# Patient Record
Sex: FEMALE | Race: WHITE | NOT HISPANIC OR LATINO | URBAN - METROPOLITAN AREA
[De-identification: names, ages, dates, MRNs, and addresses within clinical notes are randomized per-mention and may not be internally consistent; named-entity substitution may affect disease eponyms.]

---
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
## 2021-09-02 ENCOUNTER — OFFICE VISIT - RIVER FALLS (OUTPATIENT)
Dept: FAMILY MEDICINE | Facility: CLINIC | Age: 21
End: 2021-09-02

## 2021-09-02 ASSESSMENT — MIFFLIN-ST. JEOR: SCORE: 1515.34

## 2022-02-11 VITALS
WEIGHT: 151 LBS | SYSTOLIC BLOOD PRESSURE: 104 MMHG | HEIGHT: 69 IN | TEMPERATURE: 97.9 F | DIASTOLIC BLOOD PRESSURE: 78 MMHG | RESPIRATION RATE: 16 BRPM | BODY MASS INDEX: 22.36 KG/M2 | HEART RATE: 64 BPM

## 2022-02-11 VITALS — WEIGHT: 156.6 LBS | HEART RATE: 84 BPM | TEMPERATURE: 99.1 F | OXYGEN SATURATION: 98 %

## 2022-02-16 NOTE — TELEPHONE ENCOUNTER
---------------------  From: Christine Springer   To: Kevon Polanco MD;     Sent: 1/22/2020 1:42:03 PM CST  Subject: Scheduling Management     Cancelled her appointment for a rash on her arm and did not reschedule.Noted

## 2022-02-16 NOTE — LETTER
(Inserted Image. Unable to display)       February 26, 2020      CAR SPRAGUE  [address]  [place], [place] [id number]        Dear CAR,     Thank you for selecting [facility] for your healthcare needs. Below you will find the results of your recent test(s) done at our clinic.      Your results are normal!  Please come back for a follow up appointment if you are still having symptoms.       Result Name Current Result Reference Range   Group A Strep POC  NOT DETECTED 2/13/2020 NOT DETECTED -    Culture Strep A  See comment 2/13/2020      Please contact my practice at [phone] if you have any questions or concerns.     Sincerely,        Evelyn Sparks MD      What do your labs mean?  Below is a glossary of commonly ordered labs:  LDL   Bad Cholesterol   HDL   Good Cholesterol  AST/ALT   Liver Function   Cr/Creatinine   Kidney Function  Microalbumin   Kidney Function  BUN   Kidney Function  PSA   Prostate    TSH   Thyroid Hormone  HgbA1c   Diabetes Test   Hgb (Hemoglobin)   Red Blood Cells

## 2022-02-16 NOTE — PROGRESS NOTES
Patient:   CAR SPRAGUE            MRN: [id number]            FIN: [id number]               Age:   21 years     Sex:  Female     :  2000   Associated Diagnoses:   Routine sports physical exam   Author:   Jet HENNESSY, Bar HAGEN      Results Review   General results      Health Status   Allergies:    Allergic Reactions (Selected)  No Known Medication Allergies   Medications:  (Selected)   , not on any regular medications   Problem list:    No problem items selected or recorded.      Chief Complaint   2021 12:24 PM CDT    Pt here for Sports Px for Hockey at [facility] [employer]  (Modified)      Subjective   Here for sports physical for hockey at [employer] [employer]  had her first Pfizer Covid vaccine last week   up to date on Tetanus         Histories   Past Medical History:    No active or resolved past medical history items have been selected or recorded.   Family History:    Breast cancer  Mother     Procedure history:    Extraction of wisdom tooth ([id number]).   Social History:        Electronic Cigarette/Vaping Assessment            Electronic Cigarette Use: Never.      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    Eye:  Pupils are equal, round and reactive to light, Extraocular movements are intact.    HENT:   Tympanic membranes are clear, No pharyngeal erythema, No sinus tenderness.    Neck:  Supple, Non-tender, No lymphadenopathy.    Respiratory:  Lungs are clear to auscultation.    Cardiovascular:  Normal rate, Regular rhythm, No murmur.    Gastrointestinal:  Soft, Non-tender, Non-distended, Normal bowel sounds, No organomegaly.    Musculoskeletal:  Normal range of motion.    Neurologic:  Cranial Nerves II-XII are grossly intact, Normal deep tendon reflexes.    Psychiatric:  Appropriate mood & affect.       Assessment   .      Plan   Impression and Plan:     Diagnosis     Routine sports physical exam ([id number] Z02.5).     .    Condition normal sports physical, approved for 2 years without restrictions.    Orders     Orders   Charges (Evaluation and Management):  43430 periodic preventive med est patient 18-39 yrs (Charge) (Order): Quantity: 1, Routine sports physical exam.     .

## 2022-02-16 NOTE — NURSING NOTE
Comprehensive Intake Entered On:  2/13/2020 10:26 AM CST    Performed On:  2/13/2020 10:22 AM CST by Vesna Eden CMA               Summary   Chief Complaint :   c/o sore throat, hoarsenss, headache, chills. Sx present since Tuesday.    Weight Measured :   156.6 lb(Converted to: 156 lb 10 oz, 71.03 kg)    Peripheral Pulse Rate :   84 bpm   BP Site :   Right arm   BP Method :   Manual   HR Method :   Electronic   Temperature Tympanic :   99.1 DegF(Converted to: 37.3 DegC)    Oxygen Saturation :   98 %   Vesna Eden CMA - 2/13/2020 10:22 AM CST   Health Status   Allergies Verified? :   Yes   Medication History Verified? :   Yes   Pre-Visit Planning Status :   N/A   Tobacco Use? :   Never smoker   Vesna Eden CMA - 2/13/2020 10:22 AM CST   Consents   Consent for Immunization Exchange :   Consent Granted   Consent for Immunizations to Providers :   Consent Granted   Vesna Eden CMA - 2/13/2020 10:22 AM CST   Meds / Allergies   (As Of: 2/13/2020 10:26:39 AM CST)   Allergies (Active)   No Known Medication Allergies  Estimated Onset Date:   Unspecified ; Created By:   Vesna Eden CMA; Reaction Status:   Active ; Category:   Drug ; Substance:   No Known Medication Allergies ; Type:   Allergy ; Updated By:   Vesna Eden CMA; Reviewed Date:   2/13/2020 10:26 AM CST        Medication List   (As Of: 2/13/2020 10:26:39 AM CST)

## 2022-02-16 NOTE — PROGRESS NOTES
Chief Complaint  c/o sore throat, hoarsenss, headache, chills. Sx present since Tuesday.  History of Present Illness  HPI pt presents to clinic today with sore throat  no fevers, chills, headache, nausea, abdominal pain  no runny nose, vomiting, diarrhea, constipation, rash or new skin lesions, chest pain, palpitations, slurred speech, new paresthesia, shortness of breath or wheeze.  Review of systems is negative except as per HPI  Exam:  General: alert and oriented ×3 no acute distress.  HEENT: Normocephalic and atraumatic.   Eyes pupils are equal round and reactive to light extraocular motion is intact.   Hearing is grossly normal and there is no otorrhea. Tympanic membranes are pearly grey with a normal light reflex.  Nares are patent there is no rhinorrhea.   Mouth: tonsils a little enlarged and red no petechia no exudate no posterior pharyngeal cobblestoning, mucous membranes are moist and pink.  Neck: is supple, there is mild anterior cervical tender lymphadenopathy, mobile.   Chest: has bilateral rise with no increased work of breathing. clear to auscultation without wheezes, rhonchi, or rales.  Cardiovascular: normal perfusion and brisk capillary refill. S1S2 with regular rate and rhythm and no murmurs, gallops or rubs.  Musculoskeletal: no gross focal abnormalities and normal gait.  Neuro: no gross focal abnormalities and memory seems intact.  Education:  Discussed with patient ok to use tylenol or motrin OTC prn pain, also can try warm tea or throat lozenges prn pain.  If the condition worsens or does not improve then should RTC for further evaluation.  Physical Exam     Vitals & Measurements    T: 99.1   F (Tympanic)  HR: 84(Peripheral)  SpO2: 98%     WT: 156.6 lb   Assessment/Plan  Acute pharyngitis (J02.9), Sore throat (J02.9)     rAPID STREP NEG, CULTURE PENDING    Ordered:  84699 office outpatient visit 15 minutes (Charge), Quantity: 1, Sore throat     Orders:    POC, GROUP A STREP* ([facility]), Specimen  Type: Swab, Collection Date: 02/13/20 10:27:00 CST  Patient Information  Name:  CAR SPRAGUE  Address:   [address] [place] [id number]  Sex: Female  YOB: 2000  Phone: [phone]  Emergency Contact: [facility], CONTACT  MRN: [id number]  FIN: [id number]  Location: [facility]  Date of Service: 02/13/2020  Primary Care Physician:   NONE ,   Attending Physician:   Clare FLOOD Evelyn, [phone]  Problem List/Past Medical History  Ongoing    No qualifying data  Historical    No qualifying data  no chronic medical problems  Procedure/Surgical History     none  Medications  No active medications  Allergies  No Known Medication Allergies  Social History   Smoking Status - 02/13/2020    Never smoker  [employer] student  Family History     neg   Lab Results      Lab Results (Last 4 results within 90 days)       Group A Strep POC: NOT DETECTED (02/13/20 10:34:00)

## 2022-02-16 NOTE — NURSING NOTE
Comprehensive Intake Entered On:  9/2/2021 12:32 PM CDT    Performed On:  9/2/2021 12:24 PM CDT by Yao Forde CMA               Summary   Chief Complaint :   Pt here for Sports Px for Hockey at [facility]   Yao Forde CMA - 9/2/2021 12:32 PM CDT     Weight Measured :   151 lb(Converted to: 151 lb 0 oz, 68.492 kg)    Height Measured :   68.75 in(Converted to: 5 ft 9 in, 174.62 cm)    Body Mass Index :   22.46 kg/m2   Body Surface Area :   1.82 m2   Systolic Blood Pressure :   104 mmHg   Diastolic Blood Pressure :   78 mmHg   Mean Arterial Pressure :   87 mmHg   Peripheral Pulse Rate :   64 bpm   BP Site :   Right arm   Pulse Site :   Radial artery   Temperature Tympanic :   97.9 DegF(Converted to: 36.6 DegC)    Respiratory Rate :   16 br/min   Yao Forde CMA - 9/2/2021 12:24 PM CDT   Health Status   Allergies Verified? :   Yes   Medication History Verified? :   Yes   Medical History Verified? :   Yes   Pre-Visit Planning Status :   Not completed   Tobacco Use? :   Never smoker   Yao Forde CMA - 9/2/2021 12:32 PM CDT   Meds / Allergies   (As Of: 9/2/2021 12:32:00 PM CDT)   Allergies (Active)   No Known Medication Allergies  Estimated Onset Date:   Unspecified ; Created By:   Vesna Santiago CMA; Reaction Status:   Active ; Category:   Drug ; Substance:   No Known Medication Allergies ; Type:   Allergy ; Updated By:   Vesna Santiago CMA; Reviewed Date:   9/2/2021 12:29 PM CDT        Medication List   (As Of: 9/2/2021 12:32:00 PM CDT)        Past Medical History   Past Medical History   (As Of: 9/2/2021 12:32:00 PM CDT)     Procedures / Surgeries        -    Procedure History   (As Of: 9/2/2021 12:32:00 PM CDT)     Anesthesia Minutes:   0 ; Procedure Name:   Extraction of wisdom tooth ; Procedure Minutes:   0 ; Last Reviewed Dt/Tm:   9/2/2021 12:29:30 PM CDT            Family History   Family History   (As Of: 9/2/2021 12:32:01 PM CDT)   Mother:   Relation:   Mother ; Gender:   Female ;           Nomenclature:    Breast cancer ; Value:   Positive            Social History   Social History   (As Of: 9/2/2021 12:32:46 PM CDT)   Tobacco:        Never (less than 100 in lifetime)   (Last Updated: 9/2/2021 12:29:35 PM CDT by Yao Forde CMA)          Electronic Cigarette/Vaping:        Electronic Cigarette Use: Never.   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